# Patient Record
Sex: FEMALE | Employment: FULL TIME | URBAN - METROPOLITAN AREA
[De-identification: names, ages, dates, MRNs, and addresses within clinical notes are randomized per-mention and may not be internally consistent; named-entity substitution may affect disease eponyms.]

---

## 2020-08-06 ENCOUNTER — OFFICE VISIT (OUTPATIENT)
Dept: URGENT CARE | Facility: CLINIC | Age: 50
End: 2020-08-06
Payer: COMMERCIAL

## 2020-08-06 VITALS
OXYGEN SATURATION: 98 % | TEMPERATURE: 98.7 F | HEART RATE: 80 BPM | HEIGHT: 64 IN | RESPIRATION RATE: 18 BRPM | DIASTOLIC BLOOD PRESSURE: 81 MMHG | SYSTOLIC BLOOD PRESSURE: 128 MMHG | WEIGHT: 180 LBS | BODY MASS INDEX: 30.73 KG/M2

## 2020-08-06 DIAGNOSIS — M25.562 ACUTE PAIN OF LEFT KNEE: ICD-10-CM

## 2020-08-06 DIAGNOSIS — M54.12 CERVICAL RADICULOPATHY: Primary | ICD-10-CM

## 2020-08-06 PROCEDURE — 99213 OFFICE O/P EST LOW 20 MIN: CPT | Performed by: PHYSICIAN ASSISTANT

## 2020-08-06 RX ORDER — METHYLPREDNISOLONE 4 MG/1
TABLET ORAL
Qty: 1 EACH | Refills: 0 | Status: SHIPPED | OUTPATIENT
Start: 2020-08-06

## 2020-08-06 NOTE — PROGRESS NOTES
[facility]        NAME: Navid Vance is a 48 y o  female  : 1970    MRN: [id number]  DATE: 2020  TIME: 7:01 PM    Assessment and Plan   Cervical radiculopathy [M54 12]  1  Cervical radiculopathy  methylPREDNISolone 4 MG tablet therapy pack   2  Acute pain of left knee           Patient Instructions     Patient Instructions   1  Cervical radiculopathy  -Take medrol dosepack as directed with food  -Apply heat  -F/U with PCP within 1 week    2  Left posterior knee pain x 4 months- possible baker's cyst- unable to perform ultrasound here in office  -No trauma therefore I do not feel xray is warranted at this time  -No calf tenderness or palpable cords  -F/U with PCP within 1 week    Go to ER with worsening symptoms, worsening pain, fever, numbness/weakness, chest pain, shortness of breath or any signs of distress           Follow up with PCP in 3-5 days  Proceed to  ER if symptoms worsen  Chief Complaint     Chief Complaint   Patient presents with    Arm Pain     c/o left arm pain from shoulder to elbow that started today   Knee Pain     Pt c/o on going pain behind left knee thats been ongoing for several weeks         History of Present Illness       Patient is a 42-year-old female who presents today for evaluation of left-sided neck pain and left arm discomfort that started earlier today  Patient states that she has pain on the left side of her neck that radiates down to her left shoulder area  She states that it has been a constant irritating pain that she rates as a 5/10  She did not tried taking any medication at home  Patient states that she just arrived on vacation and slept in a new bed last night that was not very comfortable  Patient also states that she has had pain behind her left knee for the past 4 months which she has never had evaluated  She denies any new injury or trauma  No leg swelling or calf pain  No fevers or chills    No chest pain or shortness of breath  Review of Systems   Review of Systems   Constitutional: Negative for chills and fever  Respiratory: Negative for shortness of breath  Cardiovascular: Negative for chest pain  Musculoskeletal: Positive for arthralgias and neck pain  Skin: Negative for rash  Neurological: Negative for dizziness, weakness, numbness and headaches  All other systems reviewed and are negative  Current Medications       Current Outpatient Medications:     methylPREDNISolone 4 MG tablet therapy pack, Use as directed on package, Disp: 1 each, Rfl: 0    Current Allergies     Allergies as of 08/06/2020 - Reviewed 08/06/2020   Allergen Reaction Noted    Naproxen Hives 08/06/2020            The following portions of the patient's history were reviewed and updated as appropriate: allergies, current medications, past family history, past medical history, past social history, past surgical history and problem list      History reviewed  No pertinent past medical history  History reviewed  No pertinent surgical history  History reviewed  No pertinent family history  Medications have been verified  Objective   /81   Pulse 80   Temp 98 7 °F (37 1 °C) (Temporal)   Resp 18   Ht 5' 4" (1 626 m)   Wt 81 6 kg (180 lb)   SpO2 98%   BMI 30 90 kg/m²        Physical Exam     Physical Exam   Constitutional: She is oriented to person, place, and time  Neck: Normal range of motion and full passive range of motion without pain  Neck supple  Cardiovascular: Normal rate and regular rhythm  Pulmonary/Chest: Effort normal and breath sounds normal    Abdominal: Normal appearance  Musculoskeletal:        Arms:         Legs:    Neurological: She is alert and oriented to person, place, and time  She has normal motor skills and normal sensation  Skin: Skin is warm and dry  Psychiatric: Mood normal    Nursing note and vitals reviewed

## 2020-08-06 NOTE — PATIENT INSTRUCTIONS
1  Cervical radiculopathy  -Take medrol dosepack as directed with food  -Apply heat  -F/U with PCP within 1 week    2   Left posterior knee pain x 4 months- possible baker's cyst- unable to perform ultrasound here in office  -No trauma therefore I do not feel xray is warranted at this time  -No calf tenderness or palpable cords  -F/U with PCP within 1 week    Go to ER with worsening symptoms, worsening pain, fever, numbness/weakness, chest pain, shortness of breath or any signs of distress